# Patient Record
Sex: FEMALE | URBAN - METROPOLITAN AREA
[De-identification: names, ages, dates, MRNs, and addresses within clinical notes are randomized per-mention and may not be internally consistent; named-entity substitution may affect disease eponyms.]

---

## 2017-05-09 ENCOUNTER — OFFICE VISIT (OUTPATIENT)
Dept: INTERNAL MEDICINE | Facility: CLINIC | Age: 22
End: 2017-05-09

## 2017-05-09 VITALS
OXYGEN SATURATION: 98 % | TEMPERATURE: 98.3 F | WEIGHT: 171.6 LBS | DIASTOLIC BLOOD PRESSURE: 79 MMHG | BODY MASS INDEX: 28.59 KG/M2 | SYSTOLIC BLOOD PRESSURE: 119 MMHG | HEIGHT: 65 IN | HEART RATE: 79 BPM | RESPIRATION RATE: 18 BRPM

## 2017-05-09 DIAGNOSIS — F41.1 GENERALIZED ANXIETY DISORDER: ICD-10-CM

## 2017-05-09 DIAGNOSIS — F33.2 SEVERE EPISODE OF RECURRENT MAJOR DEPRESSIVE DISORDER, WITHOUT PSYCHOTIC FEATURES (H): Primary | ICD-10-CM

## 2017-05-09 DIAGNOSIS — T74.21XA SEXUAL ASSAULT OF ADULT, INITIAL ENCOUNTER: ICD-10-CM

## 2017-05-09 RX ORDER — BUSPIRONE HYDROCHLORIDE 10 MG/1
10 TABLET ORAL 2 TIMES DAILY
Qty: 60 TABLET | Refills: 1 | Status: SHIPPED | OUTPATIENT
Start: 2017-05-09 | End: 2017-05-12

## 2017-05-09 RX ORDER — CITALOPRAM HYDROBROMIDE 20 MG/1
20 TABLET ORAL DAILY
Qty: 30 TABLET | Refills: 1 | Status: SHIPPED | OUTPATIENT
Start: 2017-05-09 | End: 2017-05-12

## 2017-05-09 ASSESSMENT — ANXIETY QUESTIONNAIRES
5. BEING SO RESTLESS THAT IT IS HARD TO SIT STILL: NEARLY EVERY DAY
7. FEELING AFRAID AS IF SOMETHING AWFUL MIGHT HAPPEN: SEVERAL DAYS
2. NOT BEING ABLE TO STOP OR CONTROL WORRYING: NEARLY EVERY DAY
1. FEELING NERVOUS, ANXIOUS, OR ON EDGE: NEARLY EVERY DAY
IF YOU CHECKED OFF ANY PROBLEMS ON THIS QUESTIONNAIRE, HOW DIFFICULT HAVE THESE PROBLEMS MADE IT FOR YOU TO DO YOUR WORK, TAKE CARE OF THINGS AT HOME, OR GET ALONG WITH OTHER PEOPLE: EXTREMELY DIFFICULT
6. BECOMING EASILY ANNOYED OR IRRITABLE: MORE THAN HALF THE DAYS
3. WORRYING TOO MUCH ABOUT DIFFERENT THINGS: NEARLY EVERY DAY
GAD7 TOTAL SCORE: 18

## 2017-05-09 ASSESSMENT — PATIENT HEALTH QUESTIONNAIRE - PHQ9: 5. POOR APPETITE OR OVEREATING: NEARLY EVERY DAY

## 2017-05-09 ASSESSMENT — PAIN SCALES - GENERAL: PAINLEVEL: NO PAIN (0)
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
## 2017-05-09 NOTE — NURSING NOTE
Chief Complaint   Patient presents with     Establish Care     Here to establish care for new PCP     Referral     Also here for referral to Denise Taylor CMA at 5:21 PM on 5/9/2017

## 2017-05-09 NOTE — PATIENT INSTRUCTIONS
[facility] Medication Refill Request Information:  * Please contact your pharmacy regarding ANY request for medication refills.  ** [facility] = [phone]  * Please allow 3 business days for routine medication refills.  * Please allow 5 business days for controlled substance medication refills.     Primary [facility] Test Result notification information:  *You will be notified with in 7-10 days of your appointment day regarding the results of your test.  If you are on MyChart you will be notified as soon as the provider has reviewed the results and signed off on them.    [facility] [phone]     CHAGO Cruz CNP  Nurse: Fish Huerta RN    [address]   Mailcode: [id number]  [place] [address]  Phone: [phone]  Fax: [phone]      Health Psychology (Dr. Gisele Read) [phone]  Health Psychology (Dr. Lolly Joel) [phone]  Health Psychology (Dr. Richard Lopez) [phone]    Health Psychology (Dr. Kathrine Ruiz) [phone]   Health Psychology (Dr. Tammy Robles) [phone]

## 2017-05-09 NOTE — MR AVS SNAPSHOT
After Visit Summary   5/9/2017    Letty High    MRN: [id number]           Patient Information     Date Of Birth          1995        Visit Information        Provider Department      5/9/2017 5:30 PM Debo Richardson APRN CNP [facility]        Today's Diagnoses     Severe episode of recurrent major depressive disorder, without psychotic features (H)    -  1    Generalized anxiety disorder          Care Instructions    [facility] Medication Refill Request Information:  * Please contact your pharmacy regarding ANY request for medication refills.  ** [facility] Prescription Fax = [phone]  * Please allow 3 business days for routine medication refills.  * Please allow 5 business days for controlled substance medication refills.     [facility] Test Result notification information:  *You will be notified with in 7-10 days of your appointment day regarding the results of your test.  If you are on MyChart you will be notified as soon as the provider has reviewed the results and signed off on them.    Primary Care Center [phone]     CHAGO Cruz CNP  Nurse: Fish Huerta RN    [facility]  [address] [place] [address]   Mailcode: [id number]  [place] [address]  Phone: [phone]  Fax: [phone]      Health Psychology (Dr. Gisele Read) [phone]  Health Psychology (Dr. Lolly Joel) [phone]  Health Psychology (Dr. Richard Lopez) [phone]    Health Psychology (Dr. Kathrine Ruiz) [phone]   Health Psychology (Dr. Tammy Robles) [phone]               Follow-ups after your visit        Additional Services     PSYCHOLOGY ([facility] HEALTH PSYCHOLOGY) REFERRAL       Your provider has referred you to:     Please be aware that coverage of these services is subject to the terms and limitations of your health insurance plan.  Call member services at your health plan with any benefit or coverage questions.      Please bring the following  to your appointment:    >>   Any x-rays, CTs or MRIs which have been performed.  Contact the facility where they were done to arrange for  prior to your scheduled appointment.   >>   List of current medications   >>   This referral request   >>   Any documents/labs given to you for this referral                  Your next 10 appointments already scheduled     2017  5:30 PM CDT   (Arrive by 5:15 PM)   Return Visit with CHAGO Keen Pending sale to [facility])    [address] [place] [address]  [address] [facility] [address] [phone]              Who to contact     Please call your clinic at [phone] to:    Ask questions about your health    Make or cancel appointments    Discuss your medicines    Learn about your test results    Speak to your doctor   If you have compliments or concerns about an experience at your clinic, or if you wish to file a complaint, please contact [facility] Patient Relations at [phone] or email us at [email or web address]         Additional Information About Your Visit        [employer] Information     [employer] is an electronic gateway that provides easy, online access to your medical records. With [employer], you can request a clinic appointment, read your test results, renew a prescription or communicate with your care team.     To sign up for [employer] visit the website at [email or web address]   You will be asked to enter the access code listed below, as well as some personal information. Please follow the directions to create your username and password.     Your access code is: [email or web address]  Expires: 2017  6:30 AM     Your access code will  in 90 days. If you need help or a new code, please contact your [facility] Clinic or call [phone] for assistance.        Care EveryWhere ID     This is your Care EveryWhere ID. This could be  "used by other organizations to access your [place] medical records  [id number]        Your Vitals Were     Pulse Temperature Respirations Height Last Period Pulse Oximetry    79 98.3  F (36.8  C) (Oral) 18 1.651 m (5' 5\") 04/25/2017 (Exact Date) 98%    Breastfeeding? BMI (Body Mass Index)                No 28.56 kg/m2           Blood Pressure from Last 3 Encounters:   05/09/17 119/79    Weight from Last 3 Encounters:   05/09/17 77.8 kg (171 lb 9.6 oz)              We Performed the Following     PSYCHOLOGY ([facility] HEALTH PSYCHOLOGY) REFERRAL          Today's Medication Changes          These changes are accurate as of: 5/9/17  5:52 PM.  If you have any questions, ask your nurse or doctor.               Start taking these medicines.        Dose/Directions    busPIRone 10 MG tablet   Commonly known as:  BUSPAR   Used for:  Generalized anxiety disorder   Started by:  Debo Richardson APRN CNP        Dose:  10 mg   Take 1 tablet (10 mg) by mouth 2 times daily   Quantity:  60 tablet   Refills:  1       citalopram 20 MG tablet   Commonly known as:  celeXA   Used for:  Severe episode of recurrent major depressive disorder, without psychotic features (H), Generalized anxiety disorder   Started by:  Debo Richardson APRN CNP        Dose:  20 mg   Take 1 tablet (20 mg) by mouth daily   Quantity:  30 tablet   Refills:  1            Where to get your medicines      These medications were sent to [facility]/pharmacy #[id number] - [place], [place] - [address], [place] [address]     Phone:  [phone]     busPIRone 10 MG tablet    citalopram 20 MG tablet                Primary Care Provider    None Specified       No primary provider on file.        Thank you!     Thank you for choosing [facility]  for your care. Our goal is always to provide you with excellent care. Hearing back from our patients is one way we can continue to improve our services. Please take a few minutes to " complete the written survey that you may receive in the mail after your visit with us. Thank you!             Your Updated Medication List - Protect others around you: Learn how to safely use, store and throw away your medicines at www.disposemymeds.org.          This list is accurate as of: 5/9/17  5:52 PM.  Always use your most recent med list.                   Brand Name Dispense Instructions for use    busPIRone 10 MG tablet    BUSPAR    60 tablet    Take 1 tablet (10 mg) by mouth 2 times daily       citalopram 20 MG tablet    celeXA    30 tablet    Take 1 tablet (20 mg) by mouth daily

## 2017-05-10 ASSESSMENT — PATIENT HEALTH QUESTIONNAIRE - PHQ9: SUM OF ALL RESPONSES TO PHQ QUESTIONS 1-9: 21

## 2017-05-10 ASSESSMENT — ANXIETY QUESTIONNAIRES: GAD7 TOTAL SCORE: 18

## 2017-06-13 ENCOUNTER — OFFICE VISIT (OUTPATIENT)
Dept: INTERNAL MEDICINE | Facility: CLINIC | Age: 22
End: 2017-06-13

## 2017-06-13 VITALS
WEIGHT: 171.6 LBS | DIASTOLIC BLOOD PRESSURE: 76 MMHG | HEART RATE: 75 BPM | BODY MASS INDEX: 28.56 KG/M2 | SYSTOLIC BLOOD PRESSURE: 133 MMHG

## 2017-06-13 DIAGNOSIS — Z12.4 SCREENING FOR CERVICAL CANCER: Primary | ICD-10-CM

## 2017-06-13 DIAGNOSIS — F33.2 SEVERE EPISODE OF RECURRENT MAJOR DEPRESSIVE DISORDER, WITHOUT PSYCHOTIC FEATURES (H): ICD-10-CM

## 2017-06-13 DIAGNOSIS — N94.10 DYSPAREUNIA IN FEMALE: ICD-10-CM

## 2017-06-13 DIAGNOSIS — Z30.011 INITIATION OF OCP (BCP): ICD-10-CM

## 2017-06-13 DIAGNOSIS — F41.1 GENERALIZED ANXIETY DISORDER: ICD-10-CM

## 2017-06-13 DIAGNOSIS — Z23 NEED FOR HPV VACCINATION: ICD-10-CM

## 2017-06-13 DIAGNOSIS — Z11.3 ROUTINE SCREENING FOR STI (SEXUALLY TRANSMITTED INFECTION): ICD-10-CM

## 2017-06-13 DIAGNOSIS — Z00.00 ROUTINE HISTORY AND PHYSICAL EXAMINATION OF ADULT: ICD-10-CM

## 2017-06-13 RX ORDER — BUSPIRONE HYDROCHLORIDE 10 MG/1
10 TABLET ORAL 2 TIMES DAILY
Qty: 180 TABLET | Refills: 3 | Status: SHIPPED | OUTPATIENT
Start: 2017-06-13 | End: 2018-05-30

## 2017-06-13 RX ORDER — NORETHINDRONE ACETATE AND ETHINYL ESTRADIOL .02; 1 MG/1; MG/1
1 TABLET ORAL DAILY
Qty: 63 TABLET | Refills: 1 | Status: SHIPPED | OUTPATIENT
Start: 2017-06-13 | End: 2017-07-11

## 2017-06-13 RX ORDER — CITALOPRAM HYDROBROMIDE 20 MG/1
20 TABLET ORAL DAILY
Qty: 90 TABLET | Refills: 3 | Status: SHIPPED | OUTPATIENT
Start: 2017-06-13 | End: 2018-05-16

## 2017-06-13 ASSESSMENT — ANXIETY QUESTIONNAIRES

## 2017-06-13 ASSESSMENT — PAIN SCALES - GENERAL: PAINLEVEL: NO PAIN (0)

## 2017-06-13 ASSESSMENT — PATIENT HEALTH QUESTIONNAIRE - PHQ9: 5. POOR APPETITE OR OVEREATING: NOT AT ALL

## 2017-06-13 NOTE — PROGRESS NOTES
"SUBJECTIVE:  Letty High is a 22 year old female with pmh of   Patient Active Problem List   Diagnosis     Dyspareunia in female     Generalized anxiety disorder     Severe episode of recurrent major depressive disorder, without psychotic features (H)     who comes in for preventive care examination today.   She has the following concerns     Sex is painful. Painful to put tampons in.  9/10.   Depression/Anxiety--Feeling \"normal\" again on Celexa and Buspar. Initially had some side effects (tremors in jaw) but these have improved.   No more panic attacks.    Menses:  No LMP recorded.  Menstrual cycles are irregular, heavy and painful. OCP--stopped taking at age 19. Would like to restart OCP. She denies a hx of migraine w/ aura, HTN, or clotting disorder. She is not a smoker.    PAP HX:   Last No results found for: PAP  History of abnormal None    Breast:  Patient performs self breast exams No  Breast concerns No  No results found.    Sexual Hx:  Sexually active  yes, single partner, contraception - condoms  Partner(s) are  male   IS interested in STD testing    Pregnancy:   none    Medications and allergies reviewed by me today.     Family History   Problem Relation Age of Onset     Glaucoma Mother      Hyperlipidemia Mother      Arthritis Mother      Hypertension Father      Arthritis Father      HEART DISEASE Maternal Grandmother      HEART DISEASE Maternal Grandfather      Hyperlipidemia Maternal Grandfather      Celiac Disease Paternal Grandmother      MENTAL ILLNESS Paternal Grandmother      Lung Cancer Paternal Grandfather        Social History   Substance Use Topics     Smoking status: Former Smoker     Types: Cigarettes     Smokeless tobacco: Not on file      Comment: social, quit in 2016     Alcohol use Yes      Comment: 2-5 per week       Immunization History   Administered Date(s) Administered     HPVQuadrivalent 06/13/2017     TDAP Vaccine (Adacel) 01/01/2009       Review Of Systems  Constitutional: " no fevers, chills, night sweats or unintentional weight change   Eyes: no vision change, diplopia or red eyes   Ears, Nose, Mouth, Throat: no tinnitus or hearing change, no epistaxis or nasal discharge, no oral lesions, throat clear   Cardiovascular: no chest pain, palpitations, or pain with walking, no orthopnea or PND   Respiratory: no dyspnea, cough, shortness of breath or wheezing   GI: no nausea, vomiting, diarrhea or constipation, no abdominal pain   : no change in urine, no dysuria or hematuria, no sexual dysfunction   Musculoskeletal: no joint or muscle pain or swelling   Integumentary: no concerning lesions or moles   Neuro: no loss of strength or sensation, no numbness or tingling, no tremor, no dizziness, no headache   Heme/Lymph: no concerning bumps, no bleeding problems   Allergy: no environmental allergies   Psych: no depression or anxiety, no sleep problems      OBJECTIVE:    /76  Pulse 75  Wt 77.8 kg (171 lb 9.6 oz)  BMI 28.56 kg/m2   Wt Readings from Last 1 Encounters:   06/13/17 77.8 kg (171 lb 9.6 oz)     Constitutional: no distress, comfortable, pleasant   Eyes: anicteric, normal extra-ocular movements   Ears, Nose and Throat: tympanic membranes clear, nose clear and free of lesions, throat clear, neck supple with full range of motion, no thyromegaly.   Cardiovascular: regular rate and rhythm, normal S1 and S2, no murmurs, rubs or gallops, peripheral pulses full and symmetric   Respiratory: clear to auscultation, no wheezes or crackles, normal breath sounds   Gastrointestinal: positive bowel sounds, nontender, no hepatosplenomegaly, no masses   Gentiourinary: normal external genitalia,  no enlargement of the Bartholin or [place] glands, urethra normal, perineum normal and free of lesions, cervix normal without lesions, no masses or cervical motion tenderness, adnexa without enlargement or lesions  Musculoskeletal: full range of motion, no edema   Skin: no concerning lesions, no jaundice    Breast: no lumps or mass, no nipple discharge or retraction  Neurological: cranial nerves intact, normal strength and sensation, reflexes at patella and biceps normal, normal gait, no tremor   Psychological: appropriate mood, demonstrates intact judgment and logical thought processes  Lymphatic: no cervical lymphadenopathy    ASSESSMENT/PLAN:  Pt is a 22 year old female here for preventive examination    1. Screening for cervical cancer  Routine screening.  - Pap imaged thin layer screen only - recommended age 21 - 24 years    2. Routine screening for STI (sexually transmitted infection)  Recommended 100% condom use to prevent transmission.  - C. trachomatis PCR - Endocervical Swab, Clinic Collect  - N. gonorrhea PCR - Endocervical Swab, Clinic Collect    3. Need for HPV vaccination  #1. Risks and benefits discussed, vaccine administered in clinic today.  - HPV VACCINE (GARDASIL), QUADRAVALENT    4. Initiation of OCP (BCP)  Reviewed SEs and red flags. F/u in 1-2 months to assess tolerance and for refills.   - norethindrone-ethinyl estradiol (MICROGESTIN 1/20) 1-20 MG-MCG per tablet; Take 1 tablet by mouth daily  Dispense: 63 tablet; Refill: 1    5. Severe episode of recurrent major depressive disorder, without psychotic features (H)  6. Generalized anxiety disorder  Refills provided.  - citalopram (CELEXA) 20 MG tablet; Take 1 tablet (20 mg) by mouth daily  Dispense: 90 tablet; Refill: 3  - busPIRone (BUSPAR) 10 MG tablet; Take 1 tablet (10 mg) by mouth 2 times daily  Dispense: 180 tablet; Refill: 3    7. Dyspareunia in female  Discussed Kegel exercises and conscious relaxation of pelvic floor muscles. Discussed referral to pelvic floor PT or Women's Health, pt would like to try Kegels on her own first.    8. Routine history and physical examination of adult  History reviewed & updated.      FOLLOW UP: in 1-2 month(s) for OCP refills and blood work (HIV, Syphilis, HepB/C screenings)    GYN TESTING:  Breast  examination performed.Yes   Pelvic examination performed Yes    Pap   Yes  If normal PAP results pap every 3 years.  STD testing Yes  Chlamydia and Gonorrhea    PREVENTATIVE TESTING:  Breast cancer screening  not indicated   Colorectal screening not indicated    Osteoporosis screening not indicated.  Recommend that patient take 1200 mg calcium in divided doses and 1000 IU of vitamin D on daily basis.   Immunizations reviewed    Labs ordered Chlamydia and Gonorrhea  Counseling was provided in the following areas:  regular exercise  weight management  healthy diet/nutrition  alcohol use   contraception  safe sex practices/STD prevention  self breast exam     CHAGO Christianson CNP

## 2017-06-13 NOTE — PATIENT INSTRUCTIONS
[facility] Medication Refill Request Information:  * Please contact your pharmacy regarding ANY request for medication refills.  ** [facility] Prescription Fax = [phone]  * Please allow 3 business days for routine medication refills.  * Please allow 5 business days for controlled substance medication refills.     [facility] Test Result notification information:  *You will be notified with in 7-10 days of your appointment day regarding the results of your test.  If you are on MyChart you will be notified as soon as the provider has reviewed the results and signed off on them.    [facility] [phone]

## 2017-06-13 NOTE — NURSING NOTE
Chief Complaint   Patient presents with     Medication Request     pt here for medications      Gyn Exam     pt here for pap smear     Trena Berry CMA at 5:18 PM on 6/13/2017.

## 2017-06-13 NOTE — LETTER
Patient:  Letty Johnson  :   1995  MRN:     [id number]        Ms. Letty Johnson  [address]  [facility] [address]        2017    Dear Ms. Johnson,    Thank you for choosing the [facility] for your healthcare needs.  We appreciate the opportunity to serve you.    The following are your recent test results.     Letty Uribe,     Your chlamydia and gonorrhea screenings were negative. Your pap was normal.     Thank [facility] for choosing [facility]. We appreciate the opportunity to serve you and look forward to supporting your healthcare needs in the future.   If you have any questions or concerns, please call me or my nurse at [phone].     Results for orders placed or performed in visit on 17   Pap imaged thin layer screen only - recommended age 21 - 24 years   Result Value Ref Range    PAP NIL     Copath Report         Patient Name: LETTY JOHNSON  MR#: [id number]  Specimen #: [id number]  Collected: 2017  Received: 2017  Reported: 2017 14:46  Ordering Phy(s): TORRIE PANDYA    For improved result formatting, select 'View Enhanced Report Format'  under Linked Documents section.    SPECIMEN/STAIN PROCESS:  Pap imaged thin layer prep screening (Surepath, FocalPoint with guided  screening)       Pap-Cyto x 1    SOURCE: Cervical, endocervical  ----------------------------------------------------------------   Pap imaged thin layer prep screening (Surepath, FocalPoint with guided  screening)  SPECIMEN ADEQUACY:  Satisfactory for evaluation.  -Transformation zone component absent.    CYTOLOGIC INTERPRETATION:    Negative for intraepithelial lesion or malignancy    I have personally reviewed all specimens and/or slides, including the  listed special stains, and used them with my medical judgment to  determine the final diagnosis.    Electronically signed out by:    Farhat Guaman M.D.,  [facility]    Processed and screened at [facility]    CLINICAL HISTORY:    Papanicolaou Test Limitations:  Cervical cytology is a screening test  with limited sensitivity; regular screening is critical for cancer  prevention; Pap tests are primarily effective for the  diagnosis/prevention of squamous cell carcinoma, not adenocarcinomas or  other cancers.    TESTING LAB LOCATION:  [facility], [address]  [address] [phone]    COLLECTION SITE:  Client:  [facility]  Location: [facility] (B)     C. trachomatis PCR - Endocervical Swab, Clinic Collect   Result Value Ref Range    Specimen Description Cervical     Chlamydia Trachomatis PCR  NEG     Negative   Negative for C. trachomatis rRNA by transcription mediated amplification.   A negative result by transcription mediated amplification does not preclude the   presence of C. trachomatis infection because results are dependent on proper   and adequate collection, absence of inhibitors, and sufficient rRNA to be   detected.     N. gonorrhea PCR - Endocervical Swab, Clinic Collect   Result Value Ref Range    Specimen Descrip Cervical     N Gonorrhea PCR  NEG     Negative   Negative for N. gonorrhoeae rRNA by transcription mediated amplification.   A negative result by transcription mediated amplification does not preclude the   presence of N. gonorrhoeae infection because results are dependent on proper   and adequate collection, absence of inhibitors, and sufficient rRNA to be   detected.         Please contact your provider if you have any questions or concerns.  We look forward to serving your needs in the future.      Sincerely,     CHAGO Christianson CNP

## 2017-06-13 NOTE — MR AVS SNAPSHOT
After Visit Summary   6/13/2017    Letty High    MRN: [id number]           Patient Information     Date Of Birth          1995        Visit Information        Provider Department      6/13/2017 5:30 PM Debo Richardson APRN CNP [facility]        Today's Diagnoses     Screening for cervical cancer    -  1    Routine screening for STI (sexually transmitted infection)        Need for HPV vaccination        Initiation of OCP (BCP)        Severe episode of recurrent major depressive disorder, without psychotic features (H)        Generalized anxiety disorder        Dyspareunia in female          Care Instructions    Primary Care Center Medication Refill Request Information:  * Please contact your pharmacy regarding ANY request for medication refills.  ** [facility] Prescription Fax = [phone]  * Please allow 3 business days for routine medication refills.  * Please allow 5 business days for controlled substance medication refills.     Primary Care Center Test Result notification information:  *You will be notified with in 7-10 days of your appointment day regarding the results of your test.  If you are on MyChart you will be notified as soon as the provider has reviewed the results and signed off on them.    Primary Care Center [phone]             Follow-ups after your visit        Follow-up notes from your care team     Return in about 2 months (around 8/13/2017).      Your next 10 appointments already scheduled     Jul 11, 2017  5:30 PM CDT   (Arrive by 5:15 PM)   Return Visit with CHAGO Keen CNP   [facility] ([facility])    [address] [place] [address] [phone]            Aug 14, 2017  2:30 PM CDT   Nurse Visit with  Pcc Nurse   [facility] ([facility])    [address] [place] [address] [phone]               Who to contact     Please call your clinic at [phone] to:    Ask questions about your health    Make or cancel appointments    Discuss your medicines    Learn about your test results    Speak to your doctor   If you have compliments or concerns about an experience at your clinic, or if you wish to file a complaint, please contact [facility] Patient Relations at [phone] or email us at [email or web address]         Additional Information About Your Visit        [employer] Information     [employer] is an electronic gateway that provides easy, online access to your medical records. With [employer], you can request a clinic appointment, read your test results, renew a prescription or communicate with your care team.     To sign up for [employer] visit the website at [email or web address]   You will be asked to enter the access code listed below, as well as some personal information. Please follow the directions to create your username and password.     Your access code is: [email or web address]  Expires: 2017  6:30 AM     Your access code will  in 90 days. If you need help or a new code, please contact your [facility] or call [phone] for assistance.        Care EveryWhere ID     This is your Care EveryWhere ID. This could be used by other organizations to access your [place] medical records  [id number]        Your Vitals Were     Pulse BMI (Body Mass Index)                75 28.56 kg/m2           Blood Pressure from Last 3 Encounters:   17 133/76   17 119/79    Weight from Last 3 Encounters:   17 77.8 kg (171 lb 9.6 oz)   17 77.8 kg (171 lb 9.6 oz)              We Performed the Following     C. trachomatis PCR - Endocervical Swab, Clinic Collect     HPV VACCINE (GARDASIL), QUADRAVALENT     N. gonorrhea PCR - Endocervical Swab, Clinic Collect     Pap imaged thin layer screen only - recommended age 21 -  24 years          Today's Medication Changes          These changes are accurate as of: 6/13/17  6:18 PM.  If you have any questions, ask your nurse or doctor.               Start taking these medicines.        Dose/Directions    norethindrone-ethinyl estradiol 1-20 MG-MCG per tablet   Commonly known as:  MICROGESTIN 1/20   Used for:  Initiation of OCP (BCP)   Started by:  Debo Richardson APRN CNP        Dose:  1 tablet   Take 1 tablet by mouth daily   Quantity:  63 tablet   Refills:  1         These medicines have changed or have updated prescriptions.        Dose/Directions    * BUSPAR PO   This may have changed:  Another medication with the same name was added. Make sure you understand how and when to take each.   Changed by:  Debo Richardson APRN CNP        Dose:  10 mg   Take 10 mg by mouth 2 times daily   Refills:  0       * busPIRone 10 MG tablet   Commonly known as:  BUSPAR   This may have changed:  You were already taking a medication with the same name, and this prescription was added. Make sure you understand how and when to take each.   Used for:  Generalized anxiety disorder   Changed by:  Debo Richardson APRN CNP        Dose:  10 mg   Take 1 tablet (10 mg) by mouth 2 times daily   Quantity:  180 tablet   Refills:  3       citalopram 20 MG tablet   Commonly known as:  celeXA   This may have changed:  medication strength   Used for:  Severe episode of recurrent major depressive disorder, without psychotic features (H), Generalized anxiety disorder   Changed by:  Debo Richardson APRN CNP        Dose:  20 mg   Take 1 tablet (20 mg) by mouth daily   Quantity:  90 tablet   Refills:  3       * Notice:  This list has 2 medication(s) that are the same as other medications prescribed for you. Read the directions carefully, and ask your doctor or other care provider to review them with you.         Where to get your medicines      These medications were sent to [facility]/pharmacy #[id number] -  [place], [place] - [address]  [address], [place] [address]     Phone:  [phone]     busPIRone 10 MG tablet    citalopram 20 MG tablet    norethindrone-ethinyl estradiol 1-20 MG-MCG per tablet                Primary Care Provider    None Specified       No primary provider on file.        Thank you!     Thank you for choosing [facility]  for your care. Our goal is always to provide you with excellent care. Hearing back from our patients is one way we can continue to improve our services. Please take a few minutes to complete the written survey that you may receive in the mail after your visit with us. Thank you!             Your Updated Medication List - Protect others around you: Learn how to safely use, store and throw away your medicines at www.disposemymeds.org.          This list is accurate as of: 6/13/17  6:18 PM.  Always use your most recent med list.                   Brand Name Dispense Instructions for use    * BUSPAR PO      Take 10 mg by mouth 2 times daily       * busPIRone 10 MG tablet    BUSPAR    180 tablet    Take 1 tablet (10 mg) by mouth 2 times daily       citalopram 20 MG tablet    celeXA    90 tablet    Take 1 tablet (20 mg) by mouth daily       norethindrone-ethinyl estradiol 1-20 MG-MCG per tablet    MICROGESTIN 1/20    63 tablet    Take 1 tablet by mouth daily       * Notice:  This list has 2 medication(s) that are the same as other medications prescribed for you. Read the directions carefully, and ask your doctor or other care provider to review them with you.

## 2017-06-14 LAB
C TRACH DNA SPEC QL NAA+PROBE: NORMAL
N GONORRHOEA DNA SPEC QL NAA+PROBE: NORMAL
SPECIMEN SOURCE: NORMAL
SPECIMEN SOURCE: NORMAL

## 2017-06-14 ASSESSMENT — PATIENT HEALTH QUESTIONNAIRE - PHQ9: SUM OF ALL RESPONSES TO PHQ QUESTIONS 1-9: 0

## 2017-06-14 ASSESSMENT — ANXIETY QUESTIONNAIRES: GAD7 TOTAL SCORE: 0

## 2017-06-16 PROBLEM — F41.1 GENERALIZED ANXIETY DISORDER: Status: ACTIVE | Noted: 2017-06-16

## 2017-06-16 PROBLEM — N94.10 DYSPAREUNIA IN FEMALE: Status: ACTIVE | Noted: 2017-06-16

## 2017-06-16 PROBLEM — F33.2 SEVERE EPISODE OF RECURRENT MAJOR DEPRESSIVE DISORDER, WITHOUT PSYCHOTIC FEATURES (H): Status: ACTIVE | Noted: 2017-06-16

## 2017-06-16 LAB
COPATH REPORT: NORMAL
PAP: NORMAL

## 2017-07-11 ENCOUNTER — OFFICE VISIT (OUTPATIENT)
Dept: INTERNAL MEDICINE | Facility: CLINIC | Age: 22
End: 2017-07-11

## 2017-07-11 VITALS — HEART RATE: 76 BPM | DIASTOLIC BLOOD PRESSURE: 85 MMHG | SYSTOLIC BLOOD PRESSURE: 126 MMHG

## 2017-07-11 DIAGNOSIS — Z30.41 ORAL CONTRACEPTIVE PILL SURVEILLANCE: Primary | ICD-10-CM

## 2017-07-11 RX ORDER — NORETHINDRONE ACETATE AND ETHINYL ESTRADIOL .02; 1 MG/1; MG/1
1 TABLET ORAL DAILY
Qty: 63 TABLET | Refills: 3 | Status: SHIPPED | OUTPATIENT
Start: 2017-07-11 | End: 2018-05-30

## 2017-07-11 ASSESSMENT — PAIN SCALES - GENERAL: PAINLEVEL: NO PAIN (0)

## 2017-07-11 NOTE — PATIENT INSTRUCTIONS
[facility] Medication Refill Request Information:  * Please contact your pharmacy regarding ANY request for medication refills.  ** [facility] Prescription Fax = [phone]  * Please allow 3 business days for routine medication refills.  * Please allow 5 business days for controlled substance medication refills.     [facility] Test Result notification information:  *You will be notified with in 7-10 days of your appointment day regarding the results of your test.  If you are on MyChart you will be notified as soon as the provider has reviewed the results and signed off on them.    [facility] [phone]

## 2017-07-11 NOTE — PROGRESS NOTES
Letty High is a 22 year old female who comes in for    CC: OCP refill  HPI:  Ms. High has been doing well since starting microgestin 1/20. No complaints. Denies side effects--no headaches, N/V, upset stomach, leg swelling, BTB.   Using condoms with one new monogamous male partner. Sex was painful a couple times recently--related to stress. Has been going okay now. STD testing negative last month.    Other issues discussed today:     Patient Active Problem List   Diagnosis     Dyspareunia in female     Generalized anxiety disorder     Severe episode of recurrent major depressive disorder, without psychotic features (H)       Current Outpatient Prescriptions   Medication Sig Dispense Refill     BusPIRone HCl (BUSPAR PO) Take 10 mg by mouth 2 times daily       norethindrone-ethinyl estradiol (MICROGESTIN 1/20) 1-20 MG-MCG per tablet Take 1 tablet by mouth daily 63 tablet 1     citalopram (CELEXA) 20 MG tablet Take 1 tablet (20 mg) by mouth daily 90 tablet 3     busPIRone (BUSPAR) 10 MG tablet Take 1 tablet (10 mg) by mouth 2 times daily 180 tablet 3         ALLERGIES: Seasonal allergies    PAST MEDICAL HX:   Past Medical History:   Diagnosis Date     Depression      Dyspareunia in female      ALEXI (generalized anxiety disorder)      Seasonal allergies        PAST SURGICAL HX:   Past Surgical History:   Procedure Laterality Date     MOUTH SURGERY  2006    teeth pulled       IMMUNIZATION HX:   Immunization History   Administered Date(s) Administered     HPVQuadrivalent 06/13/2017     TDAP Vaccine (Adacel) 01/01/2009       SOCIAL HX:   Social History     Social History Narrative    Works as a  at [employer]       ROS:   3- point ROS reviewed and was negative except otherwise noted in HPI, including constitutional, GI, .    OBJECTIVE:  /85  Pulse 76  Breastfeeding? No   Wt Readings from Last 1 Encounters:   06/13/17 77.8 kg (171 lb 9.6 oz)     Constitutional: no distress,  comfortable, pleasant, well-groomed  Psychological: appropriate mood, demonstrates intact judgment and logical thought process      ASSESSMENT/PLAN:    1. Oral contraceptive pill surveillance  Pt tolerating well. BP stable. Refill provided. Reviewed stress management and relaxation exercises prior to intercourse to help with dyspareunia.   - norethindrone-ethinyl estradiol (MICROGESTIN 1/20) 1-20 MG-MCG per tablet; Take 1 tablet by mouth daily  Dispense: 63 tablet; Refill: 3    FOLLOW UP: in 3-6 months, or sooner as needed for any changes or concerns  CHAGO Christianson CNP

## 2017-07-11 NOTE — NURSING NOTE
Chief Complaint   Patient presents with     Recheck Medication     Patient here for medication recheck.      Dontae Graham CMA at 5:12 PM on 7/11/2017.

## 2018-05-16 DIAGNOSIS — F41.1 GENERALIZED ANXIETY DISORDER: ICD-10-CM

## 2018-05-16 DIAGNOSIS — F33.2 SEVERE EPISODE OF RECURRENT MAJOR DEPRESSIVE DISORDER, WITHOUT PSYCHOTIC FEATURES (H): ICD-10-CM

## 2018-05-18 ENCOUNTER — TELEPHONE (OUTPATIENT)
Dept: INTERNAL MEDICINE | Facility: CLINIC | Age: 23
End: 2018-05-18

## 2018-05-18 RX ORDER — CITALOPRAM HYDROBROMIDE 20 MG/1
20 TABLET ORAL DAILY
Qty: 30 TABLET | Refills: 0 | Status: SHIPPED | OUTPATIENT
Start: 2018-05-18 | End: 2018-05-30

## 2018-05-18 NOTE — TELEPHONE ENCOUNTER
Patient was called and notified that her RX was extended for 30 days.  Patient was advised to keep her scheduled appointment.    DEBO VAZQUEZ at 1:24 PM on 5/18/2018.

## 2018-05-18 NOTE — TELEPHONE ENCOUNTER
Health Call Center    Phone Message    May a detailed message be left on voicemail: yes    Reason for Call: Medication Refill Request    Has the patient contacted the pharmacy for the refill? Yes   Name of medication being requested: citalopram (CELEXA) 20 MG tablet  Provider who prescribed the medication:Debo Richardson   Pharmacy: [facility] in [facility] [address] [place] [address]  Date medication is needed: 5/19/18           Action Taken: Message routed to:  [facility]     Patient has annual physical scheduled for 5/30/18. Patient has one tablet left of the celexa. Patient was wondering if she could get a refill to last her till her appt with Debo on 5/30. Patient would like the prescription to be sent to the [facility] in [facility] on [address] [place] [address]. Please call patient is this is a possibility: [phone]

## 2018-05-18 NOTE — TELEPHONE ENCOUNTER
citalopram (CELEXA) 20 MG tablet     Last Written Prescription Date:  6/13/17  Last Fill Quantity:90,   # refills: 3  Last Office Visit : 7/11/17  Future Office visit:  None    Scheduling has been notified to contact the pt for appointment.    Routing because:  phq9    6 mth  f/u past due.

## 2018-05-18 NOTE — TELEPHONE ENCOUNTER
----- Message from Hannah Bowman RN sent at 5/18/2018 10:38 AM CDT -----  Pt   Letty High [[id number]]   [place]    Please call to schedule.    Patient is overdue for annual clinic visit - unless otherwise indicated patient needs to be scheduled with 1st available.    Patient may need labs and or imaging - please check with RN care coordinator.    Thanks    I do not need any follow up on the scheduling of this appointment unless the patient will no longer be receiving care in our clinic.

## 2018-05-18 NOTE — TELEPHONE ENCOUNTER
Called patient and left message to call back and schedule annual physical with Dylan in the Primary Care Clinic. Thanks

## 2018-05-30 ENCOUNTER — OFFICE VISIT (OUTPATIENT)
Dept: INTERNAL MEDICINE | Facility: CLINIC | Age: 23
End: 2018-05-30
Payer: COMMERCIAL

## 2018-05-30 VITALS
WEIGHT: 193.3 LBS | OXYGEN SATURATION: 93 % | BODY MASS INDEX: 33 KG/M2 | DIASTOLIC BLOOD PRESSURE: 68 MMHG | SYSTOLIC BLOOD PRESSURE: 110 MMHG | HEIGHT: 64 IN | HEART RATE: 69 BPM

## 2018-05-30 DIAGNOSIS — F33.2 SEVERE EPISODE OF RECURRENT MAJOR DEPRESSIVE DISORDER, WITHOUT PSYCHOTIC FEATURES (H): ICD-10-CM

## 2018-05-30 DIAGNOSIS — F41.1 GENERALIZED ANXIETY DISORDER: ICD-10-CM

## 2018-05-30 DIAGNOSIS — Z11.3 ROUTINE SCREENING FOR STI (SEXUALLY TRANSMITTED INFECTION): ICD-10-CM

## 2018-05-30 DIAGNOSIS — F41.1 GAD (GENERALIZED ANXIETY DISORDER): Primary | ICD-10-CM

## 2018-05-30 DIAGNOSIS — Z91.018 ALLERGY TO FOOD: ICD-10-CM

## 2018-05-30 DIAGNOSIS — Z30.41 ORAL CONTRACEPTIVE PILL SURVEILLANCE: ICD-10-CM

## 2018-05-30 DIAGNOSIS — Z23 NEED FOR HPV VACCINATION: ICD-10-CM

## 2018-05-30 DIAGNOSIS — R53.83 FATIGUE, UNSPECIFIED TYPE: ICD-10-CM

## 2018-05-30 DIAGNOSIS — F51.04 PSYCHOPHYSIOLOGICAL INSOMNIA: ICD-10-CM

## 2018-05-30 LAB
ANION GAP SERPL CALCULATED.3IONS-SCNC: 10 MMOL/L (ref 3–14)
BUN SERPL-MCNC: 10 MG/DL (ref 7–30)
CALCIUM SERPL-MCNC: 8.8 MG/DL (ref 8.5–10.1)
CHLORIDE SERPL-SCNC: 106 MMOL/L (ref 94–109)
CO2 SERPL-SCNC: 24 MMOL/L (ref 20–32)
CREAT SERPL-MCNC: 0.92 MG/DL (ref 0.52–1.04)
ERYTHROCYTE [DISTWIDTH] IN BLOOD BY AUTOMATED COUNT: 14.1 % (ref 10–15)
GFR SERPL CREATININE-BSD FRML MDRD: 74 ML/MIN/1.7M2
GLUCOSE SERPL-MCNC: 84 MG/DL (ref 70–99)
HCT VFR BLD AUTO: 35.1 % (ref 35–47)
HGB BLD-MCNC: 11.5 G/DL (ref 11.7–15.7)
MCH RBC QN AUTO: 27.1 PG (ref 26.5–33)
MCHC RBC AUTO-ENTMCNC: 32.8 G/DL (ref 31.5–36.5)
MCV RBC AUTO: 83 FL (ref 78–100)
PLATELET # BLD AUTO: 214 10E9/L (ref 150–450)
POTASSIUM SERPL-SCNC: 3.7 MMOL/L (ref 3.4–5.3)
RBC # BLD AUTO: 4.25 10E12/L (ref 3.8–5.2)
SODIUM SERPL-SCNC: 139 MMOL/L (ref 133–144)
TSH SERPL DL<=0.005 MIU/L-ACNC: 1.94 MU/L (ref 0.4–4)
WBC # BLD AUTO: 7 10E9/L (ref 4–11)

## 2018-05-30 RX ORDER — BUSPIRONE HYDROCHLORIDE 10 MG/1
10 TABLET ORAL 2 TIMES DAILY
Qty: 180 TABLET | Refills: 1 | Status: SHIPPED | OUTPATIENT
Start: 2018-05-30 | End: 2018-12-21

## 2018-05-30 RX ORDER — CITALOPRAM HYDROBROMIDE 20 MG/1
20 TABLET ORAL DAILY
Qty: 90 TABLET | Refills: 1 | Status: SHIPPED | OUTPATIENT
Start: 2018-05-30 | End: 2019-01-09

## 2018-05-30 RX ORDER — NORETHINDRONE ACETATE AND ETHINYL ESTRADIOL .02; 1 MG/1; MG/1
1 TABLET ORAL DAILY
Qty: 63 TABLET | Refills: 3 | Status: SHIPPED | OUTPATIENT
Start: 2018-05-30 | End: 2019-05-02

## 2018-05-30 ASSESSMENT — ANXIETY QUESTIONNAIRES
5. BEING SO RESTLESS THAT IT IS HARD TO SIT STILL: NOT AT ALL
3. WORRYING TOO MUCH ABOUT DIFFERENT THINGS: MORE THAN HALF THE DAYS
6. BECOMING EASILY ANNOYED OR IRRITABLE: NOT AT ALL
7. FEELING AFRAID AS IF SOMETHING AWFUL MIGHT HAPPEN: NOT AT ALL
IF YOU CHECKED OFF ANY PROBLEMS ON THIS QUESTIONNAIRE, HOW DIFFICULT HAVE THESE PROBLEMS MADE IT FOR YOU TO DO YOUR WORK, TAKE CARE OF THINGS AT HOME, OR GET ALONG WITH OTHER PEOPLE: SOMEWHAT DIFFICULT
GAD7 TOTAL SCORE: 7
2. NOT BEING ABLE TO STOP OR CONTROL WORRYING: MORE THAN HALF THE DAYS
1. FEELING NERVOUS, ANXIOUS, OR ON EDGE: MORE THAN HALF THE DAYS

## 2018-05-30 ASSESSMENT — PATIENT HEALTH QUESTIONNAIRE - PHQ9: 5. POOR APPETITE OR OVEREATING: SEVERAL DAYS

## 2018-05-30 NOTE — PROGRESS NOTES
[facility]   Debo MURILLOSybil Richardson, CHAGO CNP  05/30/2018      Chief Complaint:   Physical       History of Present Illness:   Letty High is a 22 year old female with a history of depression, anxiety, dyspareunia, and seasonal allergies who presents for a physical. The patient is feeling well today but had a rough winter. In November, she lost her job and broke up with her boyfriend on the same day. 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 She never saw a therapist for her mental health issues. Today, she would really like a referral again to see someone for therapy. Overall, her mood is better now that it is summer.    The only concern the patient has is with insomnia. She has a family history of sleep apnea which started as insomnia so she is worried this could be an issue with her. She tries to fall asleep around 10 but does not actually fall asleep until 11 to 11:30. She wakes up 2-3 times a night and can be up for 5 minutes to an hour. In order to get back to bed she usually just lays there. She does not nap or drink caffeine regularly. She took Melatonin in the past but does not take it now.       Review of Systems:   Pertinent items are noted in HPI, remainder of complete ROS is negative.    PHQ-9 SCORE 5/9/2017 6/13/2017 5/30/2018   Total Score 21 0 5     ALEXI-7 SCORE 5/9/2017 6/13/2017 5/30/2018   Total Score 18 0 7         Active Medications:     Current Outpatient Prescriptions:      busPIRone (BUSPAR) 10 MG tablet, Take 1 tablet (10 mg) by mouth 2 times daily, Disp: 180 tablet, Rfl: 1     citalopram (CELEXA) 20 MG tablet, Take 1 tablet (20 mg) by mouth daily, Disp: 90 tablet, Rfl: 1     norethindrone-ethinyl estradiol (MICROGESTIN 1/20) 1-20 MG-MCG per tablet, Take 1 tablet by mouth daily, Disp: 63 tablet, Rfl: 3     [DISCONTINUED]  "citalopram (CELEXA) 20 MG tablet, Take 1 tablet (20 mg) by mouth daily, Disp: 30 tablet, Rfl: 0     [DISCONTINUED] norethindrone-ethinyl estradiol (MICROGESTIN 1/20) 1-20 MG-MCG per tablet, Take 1 tablet by mouth daily, Disp: 63 tablet, Rfl: 3      Allergies:   Seasonal allergies      Past Medical History:  Depression  Dyspareunia  Generalized anxiety disorder  Seasonal allergies     Past Surgical History:  Mouth surgery, teeth pulled     Family History:   Glaucoma - mother  Hyperlipidemia - mother, maternal grandfather  Arthritis - mother, father  Hypertension - father  Heart disease - maternal grandmother, maternal grandfather  Celiac disease - paternal grandmother  Mental illness - paternal grandmother  Lung cancer - paternal grandfather      Social History:   Tobacco Use: former smoker (quit 2016)  Alcohol Use: 2-5 drinks/week  PCP: Debo Richardson      Physical Exam:   /68  Pulse 69  Ht 1.633 m (5' 4.29\")  Wt 87.7 kg (193 lb 4.8 oz)  SpO2 93%  Breastfeeding? No  BMI 32.88 kg/m2   Constitutional: no distress, comfortable, pleasant, well-groomed  Eyes: anicteric, conjunctiva pink, normal extra-ocular movements   Ears, Nose and Throat: tympanic membranes pearly gray with positive light reflex, EOMs clear bilaterally, nose clear and free of lesions, throat clear, mucosa pink and moist.   Neck: supple with full range of motion, no thyromegaly   Cardiovascular: regular rate and rhythm, normal S1 and S2, no murmurs, rubs or gallops, peripheral pulses full and symmetric, cap refill <2 sec  Respiratory: clear to auscultation with good air movement bilaterally, no wheezes or crackles, non-labored  Breast Exam: Without visible skin changes. No dimpling or lesions seen.  Breasts supple, non-tender with palpation, no dominant mass, nodularity, or nipple discharge noted bilaterally. Axillary nodes negative.    Gastrointestinal: positive bowel sounds, nontender, no hepatosplenomegaly, no masses   :  " Periurethral, vulvar, perineal, perianal areas are without rash or suspicious lesions.  Vaginal mucosa pink and moist, rugae with physiologic white odorless secretions, no lesions noted. Cervix is pink, moist, closed and without lesion or CMT.  Bimanual exam reveals no uterine or adnexal tenderness.  Uterus and ovaries not enlarged, no masses appreciated.  Musculoskeletal: full range of motion, strength 5/5, no edema   Skin: no concerning rash, no jaundice, temp normal, multiple scab lesions on leg with mild surrounding erythema, multiple tattoos  Neurological: cranial nerves intact, normal strength and sensation, 2+ patellar reflexes, speech is clear, no tremor   Psychological: appropriate mood, demonstrates intact judgment and logical thought process  LYMPH: no axillary, cervical,  Supraclavicular, or infraclavicular nodes     Assessment and Plan:  ALEXI (generalized anxiety disorder)  Refill provided.  - citalopram (CELEXA) 20 MG tablet  Dispense: 90 tablet; Refill: 1  - busPIRone (BUSPAR) 10 MG tablet  Dispense: 180 tablet; Refill: 1  - MENTAL HEALTH REFERRAL  - Adult; Outpatient Treatment; Individual/Couples/Family/Group Therapy/Health Psychology; [facility]: [facility] - [place] [phone]; Patient call to schedule    Severe episode of recurrent major depressive disorder, without psychotic features (H)  Psychophysiological insomnia  Encouraged pt to schedule appt with therapist to continue to working on coping skills and anxiety management. She denies SI. Reviewed Depression Action Plan and self care with working on deep breathing/meditation, sleep, and physical exercise.  Discussed sleep hygiene and recommended Melatonin 5 mg 30-60 min before bedtime.   - MENTAL HEALTH REFERRAL  - Adult; Outpatient Treatment; Individual/Couples/Family/Group Therapy/Health Psychology; [facility]: [facility] - [place] [phone]; Patient call to schedule  - citalopram (CELEXA) 20 MG tablet  Dispense: 90 tablet;  Refill: 1    Oral contraceptive pill surveillance  The patient is not currently sexually active. She is tolerating her contraceptive pill well.   - norethindrone-ethinyl estradiol (MICROGESTIN 1/20) 1-20 MG-MCG per tablet  Dispense: 63 tablet; Refill: 3    Fatigue, unspecified type    Allergy to food  The patient requested a referral to see an allergist due to various food allergies. She has a hx of multiple sensitivities and allergies to various foods, denies anaphylaxis.  - ALLERGY/ASTHMA ADULT REFERRAL        Follow-up: in 6 months to f/u mood or as needed for any changes or concerns        Scribe Disclosure:   I, Ana Campos, am serving as a scribe to document services personally performed by CHAGO Christianson CNP at this visit, based upon the provider's statements to me. All documentation has been reviewed by the aforementioned provider prior to being entered into the official medical record.     Portions of this medical record were completed by a scribe. UPON MY REVIEW AND AUTHENTICATION BY ELECTRONIC SIGNATURE, this confirms (a) I performed the applicable clinical services, and (b) the record is accurate.     CHAGO Christianson CNP

## 2018-05-30 NOTE — NURSING NOTE
Chief Complaint   Patient presents with     Physical     patient is here for annual physical     Xee Luis Miguel, SMA

## 2018-05-30 NOTE — LETTER
Patient:  Letty High  :   1995  MRN:     [id number]        Ms. Letty High  [address]  [facility] [address]        2018    Dear Ms. High,    Thank you for choosing the [facility] for your healthcare needs.  We appreciate the opportunity to serve you.    The following are your recent test results.     Your chlamydia and gonorrhea screenings were negative.    Thank you for choosing [facility]. We appreciate the opportunity to serve you and look forward to supporting your healthcare needs in the future.  If you have any questions or concerns, please call me or my nurse at [phone].    Results for orders placed or performed in visit on 18   C. trachomatis PCR - Endocervical Swab, Clinic Collect   Result Value Ref Range    Specimen Description Cervical     Chlamydia Trachomatis PCR Negative NEG^Negative   N. gonorrhea PCR - Endocervical Swab, Clinic Collect   Result Value Ref Range    Specimen Descrip Cervical     N Gonorrhea PCR Negative NEG^Negative     Please contact your provider if you have any questions or concerns.  We look forward to serving your needs in the future.      Sincerely,    CHAGO Christianson CNP

## 2018-05-30 NOTE — PATIENT INSTRUCTIONS
SPECIAL THERAPY MEASURES FOR INSOMNIA  Relaxation Exercises:  Progressive Muscle Relaxation (PMR) and Deep Breathing Exercises  Stress, tension, and anxiety can be big factors contributing to insomnia.  If you can t relax your mind and body, then you won t be able to sleep.  You would likely benefit from trying some of the relaxation exercises that we ve assembled below.  These are all internet based links.  If you don t have or use a computer, you may benefit from one of the stress management books listed below that you can get at your public library or at a local [employer] for a relatively low price.    You may have to pay for some of these resources.    http://www.[employer]/progressive-muscle-relaxation-exercise.html     http://studentsupport.[facility]/counseling/resources/self-help/relaxation-and-stress-management/     http://www.[employer]/breathing-awareness.html   Meditation and Guided Imagery    wwwLocPlanet    www.InSite VisionguidedDA Relm CollectiblesmeditationDA Relm Collectiblessite.HarQen    http://www.[employer]/guided-imagery-scripts.html    http://ybuy/UiTV/vpbxrekwru-mdddgu-oxdktwu/  Sleep Restriction Therapy:  Limit time in bed for 15 minutes more than sleep   Do not set limit under 4 hours   Increase time by 15 minutes per effective sleep trial   Effective sleep suggests >90% of bedtime asleep   Stimulus Control:  Do not lie awake for more than 30 minutes   Get out of bed and perform a quiet activity   Return to bed when sleepy   Repeat as many times per night as needed   No Napping during day   Suggested Resources:   Insomnia Treatment Books     Overcoming Insomnia by Chilo Krishnan and Brittney Bridges (2008)    No More Sleepless Nights by Raul Richardson and Cathleen Anderson (1996)    Say Monica to Insomnia by Tal Jerry (2009)    The Insomnia Workbook by Chelsea Fisher and Chucho Bryant (2009)    The Insomnia Answer by Santos Carranza and Josh Romero  (2006)    Therapy for Sleep Problems                                                                                   Cognitive behavioral treatment for insomnia (CBT-I) is a very effective technique that involves changing behaviors and thoughts associated with sleep.       Health Psychology (Dr. Gisele Read) [phone]

## 2018-05-30 NOTE — LETTER
My Depression Action Plan  Name: Letty High   Date of Birth 1995  Date: 5/30/2018    My doctor: Debo Richardson   My clinic: [facility]  [address]  [address] [facility] [address]          GREEN    ZONE   Good Control    What it looks like:     Things are going generally well. You have normal up s and down s. You may even feel depressed from time to time, but bad moods usually last less than a day.   What you need to do:  1. Continue to care for yourself (see self care plan)  2. Check your depression survival kit and update it as needed  3. Follow your physician s recommendations including any medication.  4. Do not stop taking medication unless you consult with your physician first.           YELLOW         ZONE Getting Worse    What it looks like:     Depression is starting to interfere with your life.     It may be hard to get out of bed; you may be starting to isolate yourself from others.    Symptoms of depression are starting to last most all day and this has happened for several days.     You may have suicidal thoughts but they are not constant.   What you need to do:     1. Call your care team, your response to treatment will improve if you keep your care team informed of your progress. Yellow periods are signs an adjustment may need to be made.     2. Continue your self-care, even if you have to fake it!    3. Talk to someone in your support network    4. Open up your depression survival kit           RED    ZONE Medical Alert - Get Help    What it looks like:     Depression is seriously interfering with your life.     You may experience these or other symptoms: You can t get out of bed most days, can t work or engage in other necessary activities, you have trouble taking care of basic hygiene, or basic responsibilities, thoughts of suicide or death that will not go away, self-injurious behavior.     What you need to do:  1. Call your care  team and request a same-day appointment. If they are not available (weekends or after hours) call your local crisis line, emergency room or 911.            Depression Self Care Plan / Survival Kit    Self-Care for Depression  Here s the deal. Your body and mind are really not as separate as most people think.  What you do and think affects how you feel and how you feel influences what you do and think. This means if you do things that people who feel good do, it will help you feel better.  Sometimes this is all it takes.  There is also a place for medication and therapy depending on how severe your depression is, so be sure to consult with your medical provider and/ or Behavioral Health Consultant if your symptoms are worsening or not improving.     In order to better manage my stress, I will:    Exercise  Get some form of exercise, every day. This will help reduce pain and release endorphins, the  feel good  chemicals in your brain. This is almost as good as taking antidepressants!  This is not the same as joining a gym and then never going! (they count on that by the way ) It can be as simple as just going for a walk or doing some gardening, anything that will get you moving.      Hygiene   Maintain good hygiene (Get out of bed in the morning, Make your bed, Brush your teeth, Take a shower, and Get dressed like you were going to work, even if you are unemployed).  If your clothes don't fit try to get ones that do.    Diet  I will strive to eat foods that are good for me, drink plenty of water, and avoid excessive sugar, caffeine, alcohol, and other mood-altering substances.  Some foods that are helpful in depression are: complex carbohydrates, B vitamins, flaxseed, fish or fish oil, fresh fruits and vegetables.    Psychotherapy  I agree to participate in Individual Therapy (if recommended).    Medication  If prescribed medications, I agree to take them.  Missing doses can result in serious side effects.  I  understand that drinking alcohol, or other illicit drug use, may cause potential side effects.  I will not stop my medication abruptly without first discussing it with my provider.    Staying Connected With Others  I will stay in touch with my friends, family members, and my primary care provider/team.    Use your imagination  Be creative.  We all have a creative side; it doesn t matter if it s oil painting, sand castles, or mud pies! This will also kick up the endorphins.    Witness Beauty  (AKA stop and smell the roses) Take a look outside, even in mid-winter. Notice colors, textures. Watch the squirrels and birds.     Service to others  Be of service to others.  There is always someone else in need.  By helping others we can  get out of ourselves  and remember the really important things.  This also provides opportunities for practicing all the other parts of the program.    Humor  Laugh and be silly!  Adjust your TV habits for less news and crime-drama and more comedy.    Control your stress  Try breathing deep, massage therapy, biofeedback, and meditation. Find time to relax each day.     My support system    Clinic Contact:  Phone number:    Contact 1:  Phone number:    Contact 2:  Phone number:    Jew/:  Phone number:    Therapist:  Phone number:    Local crisis center:    Phone number:    Other community support:  Phone number:

## 2018-05-31 LAB
C TRACH DNA SPEC QL NAA+PROBE: NEGATIVE
DEPRECATED CALCIDIOL+CALCIFEROL SERPL-MC: 31 UG/L (ref 20–75)
HBV SURFACE AG SERPL QL IA: NONREACTIVE
HIV 1+2 AB+HIV1 P24 AG SERPL QL IA: NONREACTIVE
N GONORRHOEA DNA SPEC QL NAA+PROBE: NEGATIVE
SPECIMEN SOURCE: NORMAL
SPECIMEN SOURCE: NORMAL
T PALLIDUM AB SER QL: NONREACTIVE

## 2018-05-31 ASSESSMENT — ANXIETY QUESTIONNAIRES: GAD7 TOTAL SCORE: 7

## 2018-05-31 ASSESSMENT — PATIENT HEALTH QUESTIONNAIRE - PHQ9: SUM OF ALL RESPONSES TO PHQ QUESTIONS 1-9: 5

## 2018-12-21 ENCOUNTER — TELEPHONE (OUTPATIENT)
Dept: INTERNAL MEDICINE | Facility: CLINIC | Age: 23
End: 2018-12-21

## 2018-12-21 DIAGNOSIS — F41.1 GENERALIZED ANXIETY DISORDER: ICD-10-CM

## 2018-12-21 RX ORDER — BUSPIRONE HYDROCHLORIDE 10 MG/1
10 TABLET ORAL 2 TIMES DAILY
Qty: 180 TABLET | Refills: 0 | Status: SHIPPED | OUTPATIENT
Start: 2018-12-21

## 2018-12-21 NOTE — TELEPHONE ENCOUNTER
I confirmed with the pharmacy that buspirone all strengths is on back order, and they are unsure when they will be available.    I called the [facility], and they do currently have some buspirone available. Will send to [facility] for refill.    I left a message for Letty. Informed her that she is due for follow up, I also requested a callback to discuss buspirone further.    Jeanette Zaldivar RN

## 2019-01-09 DIAGNOSIS — F41.1 GENERALIZED ANXIETY DISORDER: ICD-10-CM

## 2019-01-09 DIAGNOSIS — F33.2 SEVERE EPISODE OF RECURRENT MAJOR DEPRESSIVE DISORDER, WITHOUT PSYCHOTIC FEATURES (H): ICD-10-CM

## 2019-01-09 RX ORDER — CITALOPRAM HYDROBROMIDE 20 MG/1
20 TABLET ORAL DAILY
Qty: 90 TABLET | Refills: 0 | Status: SHIPPED | OUTPATIENT
Start: 2019-01-09

## 2019-01-09 NOTE — TELEPHONE ENCOUNTER
5/30/2018  [facility]  Refill Celexa: 90 day  FYI to Clinic CMA Overdue PHQ9  FYI to Clinic RN past due for appt. Scheduling has been notified to contact the pt for appointment.

## 2019-01-25 NOTE — TELEPHONE ENCOUNTER
Pt was called on 1/25/19 to review and complete a PHQ-9 questionnaire. Pt was not able to be reached. No voice message was left since there was not option to leave a voice message. Pt's phone number is either invalid or incorrect. Please ask pt on next visit for a current number for us to contact.     Nikki Rand CMA  10:03 AM 1/25/2019

## 2019-05-02 DIAGNOSIS — Z30.41 ORAL CONTRACEPTIVE PILL SURVEILLANCE: ICD-10-CM

## 2019-05-02 RX ORDER — NORETHINDRONE ACETATE AND ETHINYL ESTRADIOL .02; 1 MG/1; MG/1
1 TABLET ORAL DAILY
Qty: 90 TABLET | Refills: 0 | Status: SHIPPED | OUTPATIENT
Start: 2019-05-02

## 2019-05-02 NOTE — TELEPHONE ENCOUNTER
Last Clinic Visit: 5/30/2018  [facility]  Scheduling has been notified to contact the pt for appointment.

## 2019-05-03 NOTE — TELEPHONE ENCOUNTER
No answered. Left message with clinic number requesting patient to call back to schedule annual exam with Debo Richardson in the Primary Care Clinic. Last appointment was 5/30/18.

## 2023-10-07 NOTE — MR AVS SNAPSHOT
After Visit Summary   5/30/2018    Letty High    MRN: [id number]           Patient Information     Date Of Birth          1995        Visit Information        Provider Department      5/30/2018 3:30 PM Debo Richardson APRN [facility]        Today's Diagnoses     ALEXI (generalized anxiety disorder)    -  1    Severe episode of recurrent major depressive disorder, without psychotic features (H)        Generalized anxiety disorder        Oral contraceptive pill surveillance        Fatigue, unspecified type        Allergy to food        Routine screening for STI (sexually transmitted infection)        Need for HPV vaccination          Care Instructions    SPECIAL THERAPY MEASURES FOR INSOMNIA  Relaxation Exercises:  Progressive Muscle Relaxation (PMR) and Deep Breathing Exercises  Stress, tension, and anxiety can be big factors contributing to insomnia.  If you can t relax your mind and body, then you won t be able to sleep.  You would likely benefit from trying some of the relaxation exercises that we ve assembled below.  These are all internet based links.  If you don t have or use a computer, you may benefit from one of the stress management books listed below that you can get at your public library or at a local bookstore for a relatively low price.    You may have to pay for some of these resources.    http://www.[employer]/progressive-muscle-relaxation-exercise.html     http://studentsupport.[facility]/counseling/resources/self-help/relaxation-and-stress-management/     http://www.[employer]/breathing-awareness.html   Meditation and Guided Imagery    www.Sensum    www.theEastbeamguided-meditation-site.com    http://www.[employer]/guided-imagery-scripts.html    http://[employer]/library/ctyfceomkn-loiwrr-ajexvin/  Sleep Restriction Therapy:  Limit time in bed for 15 minutes more than sleep   Do not set  limit under 4 hours   Increase time by 15 minutes per effective sleep trial   Effective sleep suggests >90% of bedtime asleep   Stimulus Control:  Do not lie awake for more than 30 minutes   Get out of bed and perform a quiet activity   Return to bed when sleepy   Repeat as many times per night as needed   No Napping during day   Suggested Resources:   Insomnia Treatment Books     Overcoming Insomnia by Chilo Krishnan and Brittney Bridges (2008)    No More Sleepless Nights by Raul Richardosn and Cathleen Anderson (1996)    Say Monica to Insomnia by Tal Jerry (2009)    The Insomnia Workbook by Chelsea Fisher and Chucho Bryant (2009)    The Insomnia Answer by Santos Carranza and Josh Romero (2006)    Therapy for Sleep Problems                                                                                   Cognitive behavioral treatment for insomnia (CBT-I) is a very effective technique that involves changing behaviors and thoughts associated with sleep.       Health Psychology (Dr. Gisele Read) [phone]                Follow-ups after your visit        Additional Services     ALLERGY/ASTHMA ADULT REFERRAL       Your provider has referred you to: [facility] - [phone]  http://www.[facility].org/care/specialties/lung-care-pulmonology-adult/  FHN: [facility] [phone]   http://[email or web address].allergy-asthma-docs.com/  [facility]: [place] [facility] [phone]   [email or web address]    Please be aware that coverage of these services is subject to the terms and limitations of your health insurance plan.  Call member services at your health plan with any benefit or coverage questions.      Please bring the following with you to your appointment:    (1) Any X-Rays, CTs or MRIs which have been performed.  Contact the facility where they were done to arrange for  prior to your scheduled appointment.     (2) List of current medications  (3) This referral request   (4) Any documents/labs given to you for this referral            MENTAL HEALTH REFERRAL  - Adult; Outpatient Treatment; Individual/Couples/Family/Group Therapy/Health Psychology; [facility]: Health Psychology - Gisele Read [phone]; Patient call to schedule       All scheduling is subject to the client's specific insurance plan & benefits, provider/location availability, and provider clinical specialities.  Please arrive 15 minutes early for your first appointment and bring your completed paperwork.    Please be aware that coverage of these services is subject to the terms and limitations of your health insurance plan.  Call member services at your health plan with any benefit or coverage questions.                            Your next 10 appointments already scheduled     May 30, 2018  5:15 PM CDT   LAB with  LAB   [facility] ([facility])    [address] [place] [address] [phone]           Please do not eat 10-12 hours before your appointment if you are coming in fasting for labs on lipids, cholesterol, or glucose (sugar). This does not apply to pregnant women. Water, hot tea and black coffee (with nothing added) are okay. Do not drink other fluids, diet soda or chew gum.              Future tests that were ordered for you today     Open Future Orders        Priority Expected Expires Ordered    Hepatitis B surface antigen Routine 5/30/2018 5/30/2019 5/30/2018    Treponema Abs w Reflex to RPR and Titer Routine 5/30/2018 5/30/2019 5/30/2018    HIV Antigen Antibody Combo Routine 5/30/2018 5/30/2019 5/30/2018    TSH with free T4 reflex Routine 5/30/2018 6/13/2018 5/30/2018    CBC with platelets Routine 5/30/2018 6/13/2018 5/30/2018    Vitamin D deficiency screening Routine  5/31/2019 5/30/2018    Basic metabolic panel Routine 5/30/2018 6/13/2018 5/30/2018            Who to contact     Please  "call your clinic at [phone] to:    Ask questions about your health    Make or cancel appointments    Discuss your medicines    Learn about your test results    Speak to your doctor            Additional Information About Your Visit        MyChart Information     Micrima is an electronic gateway that provides easy, online access to your medical records. With Micrima, you can request a clinic appointment, read your test results, renew a prescription or communicate with your care team.     To sign up for Micrima visit the website at [email or web address]   You will be asked to enter the access code listed below, as well as some personal information. Please follow the directions to create your username and password.     Your access code is: [email or web address]  Expires: 2018  6:31 AM     Your access code will  in 90 days. If you need help or a new code, please contact your [facility] or call [phone] for assistance.        Care EveryWhere ID     This is your Care EveryWhere ID. This could be used by other organizations to access your [facility] medical records  [id number]        Your Vitals Were     Pulse Height Pulse Oximetry Breastfeeding? BMI (Body Mass Index)       69 1.633 m (5' 4.29\") 93% No 32.88 kg/m2        Blood Pressure from Last 3 Encounters:   18 110/68   17 126/85   17 133/76    Weight from Last 3 Encounters:   18 87.7 kg (193 lb 4.8 oz)   17 77.8 kg (171 lb 9.6 oz)   17 77.8 kg (171 lb 9.6 oz)              We Performed the Following     ALLERGY/ASTHMA ADULT REFERRAL     C. trachomatis PCR - Endocervical Swab, Clinic Collect     HPV VACCINE (GARDASIL 9), 9 VALENT     MENTAL HEALTH REFERRAL  - Adult; Outpatient Treatment; Individual/Couples/Family/Group Therapy/Health Psychology; [facility]: Health Psychology - Gisele Read [phone]; Patient call to schedule     N. gonorrhea PCR - Endocervical Swab, Clinic Collect        "   Where to get your medicines      These medications were sent to [place] [address] [facility] - [place], [place] - [address]  [address] [address]     Phone:  [phone]     busPIRone 10 MG tablet    citalopram 20 MG tablet    norethindrone-ethinyl estradiol 1-20 MG-MCG per tablet          Primary Care Provider Office Phone # Fax Yobany Richardson, CHAGO [facility] [phone]       [address] [address]        Equal Access to Services     [facility]: Hadii aad ku hadasho Soomaali, waaxda luqadaha, qaybta kaalmada adeegyada, waxay kalpanain hayaydee melchor . So [facility] [place] [phone].    ATENCIÓN: Si habla español, tiene a howell disposición servicios gratuitos de asistencia lingüística. [facility] [phone].    We comply with applicable federal civil rights laws and [place] laws. We do not discriminate on the basis of race, color, national origin, age, disability, sex, sexual orientation, or gender identity.            Thank you!     Thank you for choosing [facility]  for your care. Our goal is always to provide you with excellent care. Hearing back from our patients is one way we can continue to improve our services. Please take a few minutes to complete the written survey that you may receive in the mail after your visit with us. Thank you!             Your Updated Medication List - Protect others around you: Learn how to safely use, store and throw away your medicines at www.disposemymeds.org.          This list is accurate as of 5/30/18  5:02 PM.  Always use your most recent med list.                   Brand Name Dispense Instructions for use Diagnosis    busPIRone 10 MG tablet    BUSPAR    180 tablet    Take 1 tablet (10 mg) by mouth 2 times daily    Generalized anxiety disorder       citalopram 20 MG tablet    celeXA    90 tablet    Take 1 tablet (20 mg) by mouth daily    Severe episode of recurrent major depressive disorder,  without psychotic features (H), Generalized anxiety disorder       norethindrone-ethinyl estradiol 1-20 MG-MCG per tablet    MICROGESTIN 1/20    63 tablet    Take 1 tablet by mouth daily    Oral contraceptive pill surveillance          No